# Patient Record
Sex: FEMALE | URBAN - METROPOLITAN AREA
[De-identification: names, ages, dates, MRNs, and addresses within clinical notes are randomized per-mention and may not be internally consistent; named-entity substitution may affect disease eponyms.]

---

## 2020-12-22 ENCOUNTER — TELEPHONE (OUTPATIENT)
Dept: CONSULT | Facility: CLINIC | Age: 35
End: 2020-12-22

## 2021-12-28 NOTE — TELEPHONE ENCOUNTER
2nd message left for patient to call me back directly to schedule [facility] only appointment.  
LVM for patient to call back to schedule [facility] only appt.  
LVM for patient to call me back directly to schedule [facility] only appointment for osteogenesis imperfecta. Records/referral available in Care Everywhere.  
No